# Patient Record
Sex: FEMALE | Race: WHITE | URBAN - METROPOLITAN AREA
[De-identification: names, ages, dates, MRNs, and addresses within clinical notes are randomized per-mention and may not be internally consistent; named-entity substitution may affect disease eponyms.]

---

## 2022-12-28 ENCOUNTER — TELEPHONE (OUTPATIENT)
Dept: UROGYNECOLOGY | Age: 52
End: 2022-12-28

## 2022-12-28 ENCOUNTER — OFFICE VISIT (OUTPATIENT)
Dept: UROGYNECOLOGY | Age: 52
End: 2022-12-28
Payer: COMMERCIAL

## 2022-12-28 VITALS — WEIGHT: 213.2 LBS | HEIGHT: 66 IN | BODY MASS INDEX: 34.27 KG/M2

## 2022-12-28 DIAGNOSIS — N39.41 URGE INCONTINENCE: ICD-10-CM

## 2022-12-28 DIAGNOSIS — N39.3 SUI (STRESS URINARY INCONTINENCE, FEMALE): Primary | ICD-10-CM

## 2022-12-28 DIAGNOSIS — Z13.89 SCREENING FOR GENITOURINARY CONDITION: ICD-10-CM

## 2022-12-28 DIAGNOSIS — N81.89 WEAKNESS OF PELVIC FLOOR: ICD-10-CM

## 2022-12-28 LAB
BILIRUBIN, URINE, POC: NEGATIVE
BLOOD URINE, POC: NEGATIVE
GLUCOSE URINE, POC: NEGATIVE
KETONES, URINE, POC: NEGATIVE
LEUKOCYTE ESTERASE, URINE, POC: NEGATIVE
NITRITE, URINE, POC: NEGATIVE
PH, URINE, POC: 5.5 (ref 4.6–8)
PROTEIN,URINE, POC: NEGATIVE
SPECIFIC GRAVITY, URINE, POC: 1.01 (ref 1–1.03)
URINALYSIS CLARITY, POC: CLEAR
URINALYSIS COLOR, POC: YELLOW
UROBILINOGEN, POC: NORMAL

## 2022-12-28 PROCEDURE — 99204 OFFICE O/P NEW MOD 45 MIN: CPT | Performed by: OBSTETRICS & GYNECOLOGY

## 2022-12-28 PROCEDURE — 81003 URINALYSIS AUTO W/O SCOPE: CPT | Performed by: OBSTETRICS & GYNECOLOGY

## 2022-12-28 PROCEDURE — 51725 SIMPLE CYSTOMETROGRAM: CPT | Performed by: OBSTETRICS & GYNECOLOGY

## 2022-12-28 RX ORDER — ONDANSETRON 4 MG/1
TABLET, ORALLY DISINTEGRATING ORAL
COMMUNITY
Start: 2022-11-30

## 2022-12-28 RX ORDER — HYOSCYAMINE SULFATE 0.125 MG
TABLET,DISINTEGRATING ORAL
COMMUNITY
Start: 2022-12-01

## 2022-12-28 RX ORDER — DEXTROAMPHETAMINE SACCHARATE, AMPHETAMINE ASPARTATE, DEXTROAMPHETAMINE SULFATE AND AMPHETAMINE SULFATE 2.5; 2.5; 2.5; 2.5 MG/1; MG/1; MG/1; MG/1
TABLET ORAL
COMMUNITY
Start: 2022-11-21

## 2022-12-28 RX ORDER — FAMOTIDINE 20 MG/1
TABLET, FILM COATED ORAL
COMMUNITY
Start: 2022-11-30

## 2022-12-28 NOTE — ASSESSMENT & PLAN NOTE
We discussed the purpose of physical therapy which is to strengthen the pelvic floor muscles and teach proper coordination of those muscles. I described the anatomy of those muscles involved and their relationship to the end-organs in the pelvis. I described therapy techniques which include a combination of therapeutic exercise, biofeedback, neuromuscular re-education, home programs, and electrical stimulation, as well as therapeutic massage and ultrasound for pain. She wants to wait on this for now, once we get records we can talk in more detail about the plan.

## 2022-12-28 NOTE — TELEPHONE ENCOUNTER
RN called patient, patient verified PHIs, RN asked patient if they prefer to utilize PT for their incontinence or if they would like to discuss a procedure. Patient stated they would like to try PT.  RN verbalized confirmation and stated a referral would be sent for PT.

## 2022-12-28 NOTE — PROGRESS NOTES
[facility] UROGYNECOLOGY  [address]  Dept: [phone]        PCP:  Jayde primary care provider on file. 12/29/2022        HPI:  I am being asked to see this patient in consultation by Dr. Donohue ref. provider found   for New Patient and Incontinence  . Ms. Joaquín Cabrera has been experiencing prolapse for 5 years. The prolapse does not cause her pain and/or pressure. She does not have to splint to complete urination, a BM, or for comfort. Nothing makes her prolapse symptoms worse. Nothing makes her prolapse symptoms better. She has not tried a pessary, she has not tried physical therapy, she has not  had surgery for her POP. Ms. Joaquín Cabrera  has been leaking urine for 10 years. She leaks urine during the daytime and night time. She does leak urine with cough, laugh, sneeze and activity. She does leak urine with urgency. She  uses thick and goes through 3. She leaks several times per day. When she leaks she leaks various amounts. She has not tried PT, she has not tried medication . She has had procedures/surgery for this in the past. She is s/p Sling by Dr. Ayla Amin. UUI    She voids 15 times during the day. She voids 2 times over night. She has ? BM per week,(has IBS) and does not strain. She drinks 1-2 caffeine drinks beverages per day. She uses 1-2 artificial sweeteners per day. She drinks 0 alcoholic beverages per week. She has pelvic surgery in the past. Sling about 20 years ago. Her last PAP: 2022    Her last Colonoscopy: Approx 10 years ago/has one scheduled for 1/16/23. Her last Mammogram: 7/2022    She does not have a history of DM. No results found for: LABA1C  No results found for: EAG    She does not have a history of sleep apnea. Tobacco: No    Sexual History: has sex with males.     Results for orders placed or performed in visit on 12/28/22   AMB POC URINALYSIS DIP STICK AUTO W/O MICRO   Result Value Ref Range    Color (UA POC) Yellow     Clarity (UA POC) Clear     Glucose, Urine, POC Negative Negative    Bilirubin, Urine, POC Negative Negative    KETONES, Urine, POC Negative Negative    Specific Gravity, Urine, POC 1.010 1.001 - 1.035    Blood (UA POC) Negative Negative    pH, Urine, POC 5.5 4.6 - 8.0    Protein, Urine, POC Negative Negative    Urobilinogen, POC 0.2 mg/dL     Nitrite, Urine, POC Negative Negative    Leukocyte Esterase, Urine, POC Negative Negative       Ht 5' 6\" (1.676 m)   Wt 213 lb 3.2 oz (96.7 kg)   BMI 34.41 kg/m²     PVR by straight catheterization: 60 ml    Physical Exam  Vitals reviewed. Exam conducted with a chaperone present. Constitutional:       General: She is not in acute distress. Appearance: Normal appearance. She is normal weight. HENT:      Head: Normocephalic and atraumatic. Pulmonary:      Effort: Pulmonary effort is normal. No respiratory distress. Abdominal:      General: There is no distension. Palpations: There is no mass. Tenderness: There is no abdominal tenderness. There is no guarding or rebound. Hernia: No hernia is present. Musculoskeletal:      Cervical back: Normal range of motion. Skin:     General: Skin is warm and dry. Neurological:      Mental Status: She is alert and oriented to person, place, and time. Psychiatric:         Mood and Affect: Mood normal.         Behavior: Behavior normal.         Thought Content:  Thought content normal.         Judgment: Judgment normal.        Female Genitourinary   Vulva:    Normal. No lesions  Bartholin's Gland:  Bilateral , Normal, nontender  Skenes Gland:  Bilateral, Normal, nontender   Clitoris:  Normal.   Introitus:    Normal.   Urethral Meatus:  Normal appearing, normal size, no lesions, no prolapse  Urethra:  No masses, no tenderness  Vagina:  No atrophy, no discharge, no lesions  Cervix:  No lesions, no discharge  Uterus:  No tenderness, normal mobility   Adnexa:   No masses palpated, no tenderness  Bladder:  No tenderness, no masses palpated  Perineum:  Normal, no lesions    Rectal   Anorectal Exam: No hemorrhoids and no masses or lesions of the perineum        POP-Q: (Pelvic Organ Prolapse - Quantification Exam):  Pelvic Organ Prolapse Quantification 12/28/2022   Anterior Wall (Aa) - 3   Anterior Wall (Ba) - 3   Cervix or Cuff (C) - 6   Genital Haitus (gh) 3   Perineal Body (pb) 3   Total Vaginal Length (tvl) 11   Posterior Wall (Ap) - 3   Posterior Wall (Bp) - 3   Posterior Fornix (D) - 9            Pelvic floor muscles: Tender Spasm     R. Puborectalis: NO 0 /5    L. Puborectalis: NO 0 /5    R. Pubococcyg NO 0 /5    L. Pubococcyg NO 0 /5    R. Ileococcyg: NO 0 /5    L. Ileococcyg: NO 0 /5    R. Obturator Int: NO 0 /5    L. Obturator Int: NO 0 /5    R. Coccygeus: NO 0 /5    L. Coccygeus: NO 0 /5      Pelvic floor contractions: 4/5    Supine Stress Test of DELFINO: positive    Simple CMG was performed  The patients urethra was prepped in sterile fashion. She previously had voided and her bladder was then drained with a catheter for PVR. The bladder was then backfilled with 300cc of sterile fluid. The catheter was removed and she was asked to cough. Her cough stress test was positive. Her bladder was then emptied. 1. DELFINO (stress urinary incontinence, female)  Assessment & Plan:  We discussed the differential diagnosis of urinary incontinence. We also discussed the pathogenesis and etiology of stress urinary incontinence. I explained the epidemiology of incontinence. I offered her options which include nothing, physical therapy, barrier treatment, and surgery. She did have a sling but has + DELFINO on exam with simple cystometry. We are going to request records. She is going to try and pay attention to when she is leaking and what bothers her the most. At the time she is going to reduce irritants and we will figure out next best steps.    Orders:  -     SIMPLE CYSTOMETROGRAM ([id number])  -     AMB POC URINALYSIS DIP STICK AUTO W/O MICRO  -     Ambulatory referral to Physical Therapy  2. Urge incontinence  Assessment & Plan:  We discussed the differential diagnosis of overactive bladder. We discussed the epidemiology, pathogenesis, and etiology of bladder overactivity including the neurophysiologic axis. We also discussed the treatment pathway for OAB. I offered options which include nothing, medications, physical therapy, behavior modification, and neuromodulation.      -Eliminate bladder irritants  Orders:  -     AMB POC URINALYSIS DIP STICK AUTO W/O MICRO  -     Ambulatory referral to Physical Therapy  3. Screening for genitourinary condition  -     AMB POC URINALYSIS DIP STICK AUTO W/O MICRO  4. Weakness of pelvic floor  Assessment & Plan:  We discussed the purpose of physical therapy which is to strengthen the pelvic floor muscles and teach proper coordination of those muscles. I described the anatomy of those muscles involved and their relationship to the end-organs in the pelvis. I described therapy techniques which include a combination of therapeutic exercise, biofeedback, neuromuscular re-education, home programs, and electrical stimulation, as well as therapeutic massage and ultrasound for pain. She wants to wait on this for now, once we get records we can talk in more detail about the plan. No follow-ups on file.             Ruth Villa,

## 2022-12-28 NOTE — PATIENT INSTRUCTIONS
COMMON BLADDER IRRITANTS  The following is a list of foods and beverages that may irritate your bladder causing bladder contractions or \"spasms\". These bladder contractions can create the feelings of urgency and increased frequency and are associated with Overactive Bladder Syndrome. Often times, urinary incontinence may develop with increased bladder contractions. Coffee and tea (even decaffeinated)     Carbonated beverages (Coke, Pepsi, etc.)   Cold remedies   Chocolate   Citrus (whole or juiced)   Cranberry Juice or pills   C Vitamin   Cocktails   Candy and sugars   Chili and other tomato based foods   [place] foods (spicy or with MSG)   Cigarette smoking   Corn syrup   Crystal light and other drinks with artificial sweeteners (aspartame, NutraSweet, Equal, etc.)   Other foods such as honey     NOTE: not all of the listed substances will cause bladder irritation in all people. Avoid the offenders on the list for a few weeks to a month. Then slowly add back some favorites and see what your response is. Caffeinated drinks and alcohol are typically the worst offenders. You must increase your fluid intake to 4-6 glasses of water a day. Avoid drinking large volumes, instead sip 2-3 ounces every 20-30 minutes. Avoid reducing fluids which may result in an increase in the concentration of the urine which can further irritate the bladder and increase symptoms of urgency and frequency. Remember, caffeinated drinks and alcohol may further dehydrate the body. Relief tablets (calcium glycerophosphate) remove acid and neutralize foods and beverages. These should be taken with the offending food or drink. A tablespoon of baking soda in a glass of water may also reduce acidity. Suggested Substitutes   Grape Juice   Apple Juice   Herbal teas    White chocolate    Other fruits such as apricots, melons, homegrown tomatoes, bananas, prunes & plums  Bladder Goals   Void seven times a day with each void lasting eight seconds. Drink evenly throughout the day but limit 2-3 hours before bed.  No more than 1-2 cups of caffeine per day      DRINK WATER!!!

## 2022-12-28 NOTE — ASSESSMENT & PLAN NOTE
We discussed the differential diagnosis of urinary incontinence. We also discussed the pathogenesis and etiology of stress urinary incontinence. I explained the epidemiology of incontinence. I offered her options which include nothing, physical therapy, barrier treatment, and surgery. She did have a sling but has + DELFINO on exam with simple cystometry. We are going to request records. She is going to try and pay attention to when she is leaking and what bothers her the most. At the time she is going to reduce irritants and we will figure out next best steps.

## 2024-12-10 NOTE — PROGRESS NOTES
education: Not on file    Highest education level: Not on file   Occupational History    Not on file   Tobacco Use    Smoking status: Never    Smokeless tobacco: Never   Vaping Use    Vaping status: Never Used   Substance and Sexual Activity    Alcohol use: Not Currently    Drug use: Not Currently    Sexual activity: Yes     Partners: Male     Comment: Vasectomy- Hx of Ablation   Other Topics Concern    Not on file   Social History Narrative    Not on file     Social Determinants of Health     Financial Resource Strain: Not on file   Food Insecurity: Not on file   Transportation Needs: Not on file   Physical Activity: Not on file   Stress: Not on file   Social Connections: Unknown (2/2/2023)    Received from [facility]    Social Connections     Frequency of Communication with Friends and Family: Not asked     Frequency of Social Gatherings with Friends and Family: Not asked   Intimate Partner Violence: Unknown (2/2/2023)    Received from [facility]    Intimate Partner Violence     Fear of Current or Ex-Partner: Not asked     Emotionally Abused: Not asked     Physically Abused: Not asked     Sexually Abused: Not asked   Housing Stability: Not on file       Family History:  No family history on file.    Review of Systems - General ROS: negative except for that discussed in HPI      ROS:  Feeling well. No dyspnea or chest pain on exertion.  No abdominal pain, change in bowel habits, black or bloody stools.  No urinary tract symptoms. No neurological complaints.    Objective:   /72   Ht 1.676 m (5' 6\")   Wt 71.7 kg (158 lb)   BMI 25.50 kg/m²     No results found for any visits on 12/11/24.     The patient appears well, alert, oriented x 3, in no distress.  .  Abdomen:  soft without tenderness, guarding, mass or organomegaly.   Extremities show no edema, normal peripheral pulses.   Neurological is normal, no focal findings.    BREAST EXAM: not examined    PELVIC EXAM: External genitalia is whitish

## 2024-12-11 ENCOUNTER — OFFICE VISIT (OUTPATIENT)
Dept: OBGYN CLINIC | Age: 54
End: 2024-12-11
Payer: COMMERCIAL

## 2024-12-11 VITALS
DIASTOLIC BLOOD PRESSURE: 72 MMHG | SYSTOLIC BLOOD PRESSURE: 112 MMHG | HEIGHT: 66 IN | BODY MASS INDEX: 25.39 KG/M2 | WEIGHT: 158 LBS

## 2024-12-11 DIAGNOSIS — N76.5 VAGINAL ULCER: Primary | ICD-10-CM

## 2024-12-11 DIAGNOSIS — L90.0 LICHEN SCLEROSUS: ICD-10-CM

## 2024-12-11 DIAGNOSIS — A60.04 HERPES SIMPLEX VULVOVAGINITIS: ICD-10-CM

## 2024-12-11 DIAGNOSIS — N76.5 VAGINAL ULCER: ICD-10-CM

## 2024-12-11 PROCEDURE — 99204 OFFICE O/P NEW MOD 45 MIN: CPT | Performed by: OBSTETRICS & GYNECOLOGY

## 2024-12-11 RX ORDER — VALACYCLOVIR HYDROCHLORIDE 500 MG/1
TABLET, FILM COATED ORAL
Qty: 90 TABLET | Refills: 0 | Status: SHIPPED | OUTPATIENT
Start: 2024-12-11

## 2024-12-11 RX ORDER — CLOBETASOL PROPIONATE 0.5 MG/G
OINTMENT TOPICAL
Qty: 45 G | Refills: 0 | Status: SHIPPED | OUTPATIENT
Start: 2024-12-11

## 2024-12-11 RX ORDER — VALACYCLOVIR HYDROCHLORIDE 1 G/1
TABLET, FILM COATED ORAL
Qty: 4 TABLET | Refills: 0 | Status: SHIPPED | OUTPATIENT
Start: 2024-12-11

## 2024-12-13 LAB
HSV1 IGG SER IA-ACNC: NON REACTIVE
HSV2 IGG SER IA-ACNC: NON REACTIVE

## 2024-12-18 ENCOUNTER — TELEPHONE (OUTPATIENT)
Dept: OBGYN CLINIC | Age: 54
End: 2024-12-18

## 2024-12-18 NOTE — TELEPHONE ENCOUNTER
----- Message from Dr. Julita Dodson MD sent at 12/16/2024 10:39 AM EST -----  This could be primary outbreak- will need either ag  culture or to repeat hsv ig g  ----- Message -----  From: Reinaldo Sauer Incoming [place] [facility]  Sent: 12/13/2024   1:38 AM EST  To: Julita Dodson MD

## 2024-12-19 NOTE — TELEPHONE ENCOUNTER
Attempted to contact patient.  No answer.  HSV 1&2 IGM is no longer a test that can be ordered per hospital lab.  Pt stated yesterday that spouse will be taking a new job and will have a new insurance in the new year and will not be able to come in until February.  Dr. Couch recommends repeat testing and follow up visit in February since patient will not have insurance for January.   Attempted to contact patient.  No answer.  LMOM notifying patient that IGM testing is no longer offered and to come back in February for recheck appt and repeat labs.  Appt for January cancelled.  Pt is to call back with any questions and to reschedule appt for February.

## 2025-02-10 NOTE — PROGRESS NOTES
The patient is a 54 y.o.  who is seen for vaginal ulcer, lichen sclerosus, and to discuss HRT. She had HSV 1 and 2 IgG testing at last visit, both non reactive. Was given rx for clobetasol ointment. She has been having pain, itching waking her up at night. Does also have hx of psoriasis. Pt states that she used the clobetasol and it worked well, no concerns today, no return symptoms since. Would like to discuss HRT today.   Also wants to talk about hormones  Last period 25 years with ablation   Hot flashes night sweats does not sleep   Mood disruption  Brain fog     Notes from last OV:  PELVIC EXAM: External genitalia is whitish discoloration of the entire right labia cc with ls, urethra, urethra meatus and bladder are midline well supported. There is a single slit like ulcer periclitoral extremely tender cc with hsv Vagina is well rugated, labia palpated and no other masses or lesions palpated no nodules no bartholins or skenes gland enlargement  Feel like she has 2 issues LS on right labia vs chronic contact dermatitis and recurrent hsv- given single ulcer and 3 episodes in past year    HISTORY:        No LMP recorded. Patient has had an ablation.    Current Outpatient Medications on File Prior to Visit   Medication Sig Dispense Refill    clobetasol (TEMOVATE) 0.05 % ointment Apply topically four times daily x 1 week, then 3 times daily x 1 week, then 2 times daily x 1 week, then 1 time daily x 1 week and then stop completely. 45 g 0    amphetamine-dextroamphetamine (ADDERALL) 10 MG tablet TAKE 1 TABLET BY MOUTH ONCE DAILY (Patient not taking: Reported on 2025)       No current facility-administered medications on file prior to visit.       ROS:  Feeling well. No dyspnea or chest pain on exertion.  No abdominal pain, change in bowel habits, black or bloody stools.  No urinary tract symptoms. GYN ROS: no breast pain or new or enlarging lumps on self exam.    PHYSICAL EXAM:  Blood pressure 112/72,

## 2025-02-11 ENCOUNTER — OFFICE VISIT (OUTPATIENT)
Dept: OBGYN CLINIC | Age: 55
End: 2025-02-11
Payer: COMMERCIAL

## 2025-02-11 VITALS
HEIGHT: 66 IN | WEIGHT: 159.2 LBS | SYSTOLIC BLOOD PRESSURE: 112 MMHG | DIASTOLIC BLOOD PRESSURE: 72 MMHG | BODY MASS INDEX: 25.58 KG/M2

## 2025-02-11 DIAGNOSIS — Z78.0 POSTMENOPAUSAL: ICD-10-CM

## 2025-02-11 DIAGNOSIS — L90.0 LICHEN SCLEROSUS: Primary | ICD-10-CM

## 2025-02-11 DIAGNOSIS — N81.89 WEAKNESS OF PELVIC FLOOR: ICD-10-CM

## 2025-02-11 PROCEDURE — 99215 OFFICE O/P EST HI 40 MIN: CPT | Performed by: OBSTETRICS & GYNECOLOGY

## 2025-02-11 RX ORDER — ESTRADIOL 0.05 MG/D
1 PATCH, EXTENDED RELEASE TRANSDERMAL
Qty: 8 PATCH | Refills: 1 | Status: SHIPPED | OUTPATIENT
Start: 2025-02-13

## 2025-02-11 RX ORDER — PROGESTERONE 100 MG/1
100 CAPSULE ORAL NIGHTLY
Qty: 30 CAPSULE | Refills: 1 | Status: SHIPPED | OUTPATIENT
Start: 2025-02-11

## 2025-02-11 ASSESSMENT — PATIENT HEALTH QUESTIONNAIRE - PHQ9
SUM OF ALL RESPONSES TO PHQ9 QUESTIONS 1 & 2: 0
SUM OF ALL RESPONSES TO PHQ QUESTIONS 1-9: 0
SUM OF ALL RESPONSES TO PHQ QUESTIONS 1-9: 0
2. FEELING DOWN, DEPRESSED OR HOPELESS: NOT AT ALL
1. LITTLE INTEREST OR PLEASURE IN DOING THINGS: NOT AT ALL
SUM OF ALL RESPONSES TO PHQ QUESTIONS 1-9: 0
SUM OF ALL RESPONSES TO PHQ QUESTIONS 1-9: 0

## 2025-03-27 ENCOUNTER — PATIENT MESSAGE (OUTPATIENT)
Dept: OBGYN CLINIC | Age: 55
End: 2025-03-27

## 2025-03-27 RX ORDER — FLUCONAZOLE 150 MG/1
TABLET ORAL
Qty: 2 TABLET | Refills: 0 | Status: SHIPPED | OUTPATIENT
Start: 2025-03-27

## 2025-03-27 NOTE — TELEPHONE ENCOUNTER
Orders Placed This Encounter    fluconazole (DIFLUCAN) 150 MG tablet     Sig: Take one tablet by mouth now and repeat in 72 hours as needed     Dispense:  2 tablet     Refill:  0